# Patient Record
Sex: FEMALE | URBAN - METROPOLITAN AREA
[De-identification: names, ages, dates, MRNs, and addresses within clinical notes are randomized per-mention and may not be internally consistent; named-entity substitution may affect disease eponyms.]

---

## 2019-05-23 ENCOUNTER — OFFICE VISIT (OUTPATIENT)
Dept: URBAN - METROPOLITAN AREA CLINIC 23 | Facility: CLINIC | Age: 69
End: 2019-05-23
Payer: MEDICARE

## 2019-05-23 DIAGNOSIS — E11.9 TYPE 2 DIABETES MELLITUS WITHOUT COMPLICATIONS: Primary | ICD-10-CM

## 2019-05-23 DIAGNOSIS — H25.13 AGE-RELATED NUCLEAR CATARACT, BILATERAL: ICD-10-CM

## 2019-05-23 PROCEDURE — 92134 CPTRZ OPH DX IMG PST SGM RTA: CPT | Performed by: OPTOMETRIST

## 2019-05-23 PROCEDURE — 92014 COMPRE OPH EXAM EST PT 1/>: CPT | Performed by: OPTOMETRIST

## 2019-05-23 ASSESSMENT — KERATOMETRY
OS: 43.88
OD: 44.50

## 2019-05-23 ASSESSMENT — INTRAOCULAR PRESSURE
OS: 15
OD: 15

## 2019-05-23 NOTE — IMPRESSION/PLAN
Impression: Type 2 diabetes mellitus without complications: Z26.4. OU. Plan: Discussed diagnosis in detail with patient. Advised and emphasized patient of blood sugar control. Discussed risks of progression. Poor compliance can lead to blindness. OCT macula performed and reviewed with patient today- no signs of diabetic retinopathy. Reassured patient of condition and treatment. Will continue to observe condition and or symptoms.

## 2022-08-31 ENCOUNTER — OFFICE VISIT (OUTPATIENT)
Dept: URBAN - METROPOLITAN AREA CLINIC 26 | Facility: CLINIC | Age: 72
End: 2022-08-31
Payer: MEDICARE

## 2022-08-31 DIAGNOSIS — H52.4 PRESBYOPIA: ICD-10-CM

## 2022-08-31 DIAGNOSIS — Z79.84 LONG TERM (CURRENT) USE OF ORAL HYPOGLYCEMIC DRUGS: ICD-10-CM

## 2022-08-31 DIAGNOSIS — H25.813 COMBINED FORMS OF AGE-RELATED CATARACT, BILATERAL: ICD-10-CM

## 2022-08-31 DIAGNOSIS — E11.9 TYPE 2 DIABETES MELLITUS W/O COMPLICATION: Primary | ICD-10-CM

## 2022-08-31 PROCEDURE — 92004 COMPRE OPH EXAM NEW PT 1/>: CPT | Performed by: OPTOMETRIST

## 2022-08-31 PROCEDURE — 92134 CPTRZ OPH DX IMG PST SGM RTA: CPT | Performed by: OPTOMETRIST

## 2022-08-31 ASSESSMENT — VISUAL ACUITY
OD: 20/20
OS: 20/20

## 2022-08-31 ASSESSMENT — KERATOMETRY
OS: 44.00
OD: 44.38

## 2022-08-31 ASSESSMENT — INTRAOCULAR PRESSURE
OD: 14
OS: 14

## 2022-08-31 NOTE — IMPRESSION/PLAN
Impression: Type 2 diabetes mellitus w/o complication: E72.2. Plan: No Non-Proliferative Diabetic Retinopathy, no Diabetic Macular Edema and no Neovascularization of the iris, disc, or elsewhere. Discussed ocular and systemic benefits of blood sugar control. Check annually.

## 2023-06-07 ENCOUNTER — OFFICE VISIT (OUTPATIENT)
Dept: URBAN - METROPOLITAN AREA CLINIC 26 | Facility: CLINIC | Age: 73
End: 2023-06-07
Payer: MEDICARE

## 2023-06-07 DIAGNOSIS — H52.4 PRESBYOPIA: ICD-10-CM

## 2023-06-07 DIAGNOSIS — Z79.84 LONG TERM (CURRENT) USE OF ORAL HYPOGLYCEMIC DRUGS: ICD-10-CM

## 2023-06-07 DIAGNOSIS — E11.9 TYPE 2 DIABETES MELLITUS W/O COMPLICATION: Primary | ICD-10-CM

## 2023-06-07 DIAGNOSIS — H25.813 COMBINED FORMS OF AGE-RELATED CATARACT, BILATERAL: ICD-10-CM

## 2023-06-07 PROCEDURE — 92134 CPTRZ OPH DX IMG PST SGM RTA: CPT | Performed by: OPTOMETRIST

## 2023-06-07 PROCEDURE — 92014 COMPRE OPH EXAM EST PT 1/>: CPT | Performed by: OPTOMETRIST

## 2023-06-07 ASSESSMENT — VISUAL ACUITY
OD: 20/25
OS: 20/20

## 2023-06-07 ASSESSMENT — KERATOMETRY
OD: 44.25
OS: 44.13

## 2023-06-07 ASSESSMENT — INTRAOCULAR PRESSURE
OS: 15
OD: 14

## 2023-06-07 NOTE — IMPRESSION/PLAN
Impression: Type 2 diabetes mellitus w/o complication: T38.7. Plan: No Non-Proliferative Diabetic Retinopathy, no Diabetic Macular Edema and no Neovascularization of the iris, disc, or elsewhere. Discussed ocular and systemic benefits of blood sugar control. Check annually.